# Patient Record
Sex: MALE | Race: WHITE | Employment: PART TIME | URBAN - METROPOLITAN AREA
[De-identification: names, ages, dates, MRNs, and addresses within clinical notes are randomized per-mention and may not be internally consistent; named-entity substitution may affect disease eponyms.]

---

## 2017-09-18 PROBLEM — H50.52 EXOPHORIA: Status: ACTIVE | Noted: 2017-09-18

## 2020-01-11 ENCOUNTER — HOSPITAL ENCOUNTER (EMERGENCY)
Age: 25
Discharge: HOME | End: 2020-01-11
Attending: EMERGENCY MEDICINE
Payer: COMMERCIAL

## 2020-01-11 ENCOUNTER — APPOINTMENT (OUTPATIENT)
Dept: CT IMAGING | Age: 25
End: 2020-01-11
Attending: EMERGENCY MEDICINE
Payer: COMMERCIAL

## 2020-01-11 ENCOUNTER — APPOINTMENT (OUTPATIENT)
Dept: GENERAL RADIOLOGY | Age: 25
End: 2020-01-11
Attending: EMERGENCY MEDICINE
Payer: COMMERCIAL

## 2020-01-11 VITALS
DIASTOLIC BLOOD PRESSURE: 89 MMHG | HEIGHT: 75 IN | SYSTOLIC BLOOD PRESSURE: 137 MMHG | BODY MASS INDEX: 23.85 KG/M2 | OXYGEN SATURATION: 100 % | HEART RATE: 99 BPM | WEIGHT: 191.8 LBS | RESPIRATION RATE: 12 BRPM

## 2020-01-11 DIAGNOSIS — R55 BRIEF LOSS OF CONSCIOUSNESS: ICD-10-CM
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
## 2020-01-11 DIAGNOSIS — G44.319 ACUTE POST-TRAUMATIC HEADACHE, NOT INTRACTABLE: ICD-10-CM

## 2020-01-11 DIAGNOSIS — M79.605 BILATERAL LEG PAIN: ICD-10-CM

## 2020-01-11 DIAGNOSIS — M79.604 BILATERAL LEG PAIN: ICD-10-CM

## 2020-01-11 PROCEDURE — 73590 X-RAY EXAM OF LOWER LEG: CPT

## 2020-01-11 PROCEDURE — 70450 CT HEAD/BRAIN W/O DYE: CPT

## 2020-01-11 PROCEDURE — 99285 EMERGENCY DEPT VISIT HI MDM: CPT

## 2020-01-11 PROCEDURE — 74011250637 HC RX REV CODE- 250/637: Performed by: EMERGENCY MEDICINE

## 2020-01-11 RX ORDER — IBUPROFEN 400 MG/1
TABLET ORAL
Status: DISPENSED
Start: 2020-01-11 | End: 2020-01-11

## 2020-01-11 RX ORDER — NAPROXEN 500 MG/1
500 TABLET ORAL 2 TIMES DAILY WITH MEALS
Qty: 20 TAB | Refills: 0 | Status: SHIPPED | OUTPATIENT
Start: 2020-01-11

## 2020-01-11 RX ORDER — ACETAMINOPHEN 500 MG
TABLET ORAL
Status: DISPENSED
Start: 2020-01-11 | End: 2020-01-11

## 2020-01-11 RX ORDER — ACETAMINOPHEN 500 MG
1000 TABLET ORAL
Status: COMPLETED | OUTPATIENT
Start: 2020-01-11 | End: 2020-01-11

## 2020-01-11 RX ORDER — IBUPROFEN 400 MG/1
800 TABLET ORAL
Status: COMPLETED | OUTPATIENT
Start: 2020-01-11 | End: 2020-01-11

## 2020-01-11 RX ADMIN — ACETAMINOPHEN 1000 MG: 500 TABLET ORAL at 03:43

## 2020-01-11 RX ADMIN — IBUPROFEN 800 MG: 400 TABLET, FILM COATED ORAL at 03:43
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
